# Patient Record
Sex: MALE | Race: WHITE | NOT HISPANIC OR LATINO | URBAN - NONMETROPOLITAN AREA
[De-identification: names, ages, dates, MRNs, and addresses within clinical notes are randomized per-mention and may not be internally consistent; named-entity substitution may affect disease eponyms.]

---

## 2021-06-15 ENCOUNTER — HOSPITAL ENCOUNTER (EMERGENCY)
Facility: HOSPITAL | Age: 41
Discharge: HOME | End: 2021-06-16
Attending: EMERGENCY MEDICINE | Admitting: EMERGENCY MEDICINE

## 2021-06-15 ENCOUNTER — APPOINTMENT (OUTPATIENT)
Dept: GENERAL RADIOLOGY | Facility: HOSPITAL | Age: 41
End: 2021-06-15

## 2021-06-15 DIAGNOSIS — F32.A DEPRESSION, UNSPECIFIED DEPRESSION TYPE: ICD-10-CM

## 2021-06-15 DIAGNOSIS — F19.10 POLYSUBSTANCE ABUSE (HCC): Primary | ICD-10-CM

## 2021-06-15 LAB
ALBUMIN SERPL-MCNC: 4.5 G/DL (ref 3.5–5.2)
ALBUMIN/GLOB SERPL: 1.7 G/DL
ALP SERPL-CCNC: 78 U/L (ref 39–117)
ALT SERPL W P-5'-P-CCNC: 10 U/L (ref 1–41)
AMPHET+METHAMPHET UR QL: POSITIVE
AMPHETAMINES UR QL: POSITIVE
ANION GAP SERPL CALCULATED.3IONS-SCNC: 9.7 MMOL/L (ref 5–15)
APAP SERPL-MCNC: <5 MCG/ML (ref 0–30)
AST SERPL-CCNC: 33 U/L (ref 1–40)
BARBITURATES UR QL SCN: NEGATIVE
BASOPHILS # BLD AUTO: 0.03 10*3/MM3 (ref 0–0.2)
BASOPHILS NFR BLD AUTO: 0.3 % (ref 0–1.5)
BENZODIAZ UR QL SCN: NEGATIVE
BILIRUB SERPL-MCNC: 0.9 MG/DL (ref 0–1.2)
BUN SERPL-MCNC: 11 MG/DL (ref 6–20)
BUN/CREAT SERPL: 11.5 (ref 7–25)
BUPRENORPHINE SERPL-MCNC: POSITIVE NG/ML
CALCIUM SPEC-SCNC: 9.3 MG/DL (ref 8.6–10.5)
CANNABINOIDS SERPL QL: NEGATIVE
CHLORIDE SERPL-SCNC: 100 MMOL/L (ref 98–107)
CO2 SERPL-SCNC: 26.3 MMOL/L (ref 22–29)
COCAINE UR QL: NEGATIVE
CREAT SERPL-MCNC: 0.96 MG/DL (ref 0.76–1.27)
DEPRECATED RDW RBC AUTO: 42.1 FL (ref 37–54)
EOSINOPHIL # BLD AUTO: 0.14 10*3/MM3 (ref 0–0.4)
EOSINOPHIL NFR BLD AUTO: 1.6 % (ref 0.3–6.2)
ERYTHROCYTE [DISTWIDTH] IN BLOOD BY AUTOMATED COUNT: 12.8 % (ref 12.3–15.4)
ETHANOL BLD-MCNC: <10 MG/DL (ref 0–10)
ETHANOL UR QL: <0.01 %
GFR SERPL CREATININE-BSD FRML MDRD: 86 ML/MIN/1.73
GLOBULIN UR ELPH-MCNC: 2.7 GM/DL
GLUCOSE SERPL-MCNC: 102 MG/DL (ref 65–99)
HCT VFR BLD AUTO: 41.4 % (ref 37.5–51)
HGB BLD-MCNC: 14.8 G/DL (ref 13–17.7)
IMM GRANULOCYTES # BLD AUTO: 0.04 10*3/MM3 (ref 0–0.05)
IMM GRANULOCYTES NFR BLD AUTO: 0.4 % (ref 0–0.5)
LYMPHOCYTES # BLD AUTO: 1.46 10*3/MM3 (ref 0.7–3.1)
LYMPHOCYTES NFR BLD AUTO: 16.4 % (ref 19.6–45.3)
MCH RBC QN AUTO: 32.3 PG (ref 26.6–33)
MCHC RBC AUTO-ENTMCNC: 35.7 G/DL (ref 31.5–35.7)
MCV RBC AUTO: 90.4 FL (ref 79–97)
METHADONE UR QL SCN: NEGATIVE
MONOCYTES # BLD AUTO: 1.46 10*3/MM3 (ref 0.1–0.9)
MONOCYTES NFR BLD AUTO: 16.4 % (ref 5–12)
NEUTROPHILS NFR BLD AUTO: 5.76 10*3/MM3 (ref 1.7–7)
NEUTROPHILS NFR BLD AUTO: 64.9 % (ref 42.7–76)
NRBC BLD AUTO-RTO: 0 /100 WBC (ref 0–0.2)
OPIATES UR QL: NEGATIVE
OXYCODONE UR QL SCN: NEGATIVE
PCP UR QL SCN: NEGATIVE
PLATELET # BLD AUTO: 199 10*3/MM3 (ref 140–450)
PMV BLD AUTO: 11.1 FL (ref 6–12)
POTASSIUM SERPL-SCNC: 3.9 MMOL/L (ref 3.5–5.2)
PROPOXYPH UR QL: NEGATIVE
PROT SERPL-MCNC: 7.2 G/DL (ref 6–8.5)
RBC # BLD AUTO: 4.58 10*6/MM3 (ref 4.14–5.8)
SALICYLATES SERPL-MCNC: <0.3 MG/DL
SODIUM SERPL-SCNC: 136 MMOL/L (ref 136–145)
TRICYCLICS UR QL SCN: NEGATIVE
WBC # BLD AUTO: 8.89 10*3/MM3 (ref 3.4–10.8)

## 2021-06-15 PROCEDURE — 82077 ASSAY SPEC XCP UR&BREATH IA: CPT | Performed by: EMERGENCY MEDICINE

## 2021-06-15 PROCEDURE — 73130 X-RAY EXAM OF HAND: CPT

## 2021-06-15 PROCEDURE — 80306 DRUG TEST PRSMV INSTRMNT: CPT | Performed by: EMERGENCY MEDICINE

## 2021-06-15 PROCEDURE — 80143 DRUG ASSAY ACETAMINOPHEN: CPT | Performed by: EMERGENCY MEDICINE

## 2021-06-15 PROCEDURE — 99284 EMERGENCY DEPT VISIT MOD MDM: CPT

## 2021-06-15 PROCEDURE — 96374 THER/PROPH/DIAG INJ IV PUSH: CPT

## 2021-06-15 PROCEDURE — 25010000002 LORAZEPAM PER 2 MG: Performed by: EMERGENCY MEDICINE

## 2021-06-15 PROCEDURE — 80179 DRUG ASSAY SALICYLATE: CPT | Performed by: EMERGENCY MEDICINE

## 2021-06-15 PROCEDURE — 73110 X-RAY EXAM OF WRIST: CPT

## 2021-06-15 PROCEDURE — 80053 COMPREHEN METABOLIC PANEL: CPT | Performed by: EMERGENCY MEDICINE

## 2021-06-15 PROCEDURE — 85025 COMPLETE CBC W/AUTO DIFF WBC: CPT | Performed by: EMERGENCY MEDICINE

## 2021-06-15 RX ORDER — LORAZEPAM 2 MG/ML
1 INJECTION INTRAMUSCULAR ONCE
Status: COMPLETED | OUTPATIENT
Start: 2021-06-15 | End: 2021-06-15

## 2021-06-15 RX ORDER — DIPHENHYDRAMINE HYDROCHLORIDE AND LIDOCAINE HYDROCHLORIDE AND ALUMINUM HYDROXIDE AND MAGNESIUM HYDRO
5 KIT ONCE
Status: DISCONTINUED | OUTPATIENT
Start: 2021-06-15 | End: 2021-06-16 | Stop reason: HOSPADM

## 2021-06-15 RX ADMIN — LORAZEPAM 1 MG: 2 INJECTION INTRAMUSCULAR; INTRAVENOUS at 20:18

## 2021-06-15 NOTE — ED PROVIDER NOTES
Subjective   History of Present Illness     41-year-old male presents ER by EMS for vague complaints of depression symptoms and wanting to talk to someone.  Patient was reportedly on telephone with his mother who felt like he needed help so she called the police.  EMS was then called and patient was brought here to be evaluated.  Patient denies suicidal ideation.  He admits to feeling depressed.  History of polysubstance abuse.  Patient has recently been in correction.  States was here in a long-term house.  Is very difficult to get meaningful history history out of him    Review of Systems   Unable to perform ROS: Psychiatric disorder       No past medical history on file.    No Known Allergies    No past surgical history on file.    No family history on file.    Social History     Socioeconomic History   • Marital status: Single     Spouse name: Not on file   • Number of children: Not on file   • Years of education: Not on file   • Highest education level: Not on file   Tobacco Use   • Smoking status: Current Every Day Smoker     Packs/day: 1.50     Types: Cigarettes   Substance and Sexual Activity   • Alcohol use: Never   • Drug use: Not Currently     Types: Methamphetamines     Comment: LAST USE 1 WEEK AGO           Objective   Physical Exam  Vitals and nursing note reviewed.   Constitutional:       Comments: Tearful, also psychomotor agitation   HENT:      Head: Normocephalic and atraumatic.      Nose: Nose normal.      Mouth/Throat:      Mouth: Mucous membranes are moist.   Eyes:      Conjunctiva/sclera: Conjunctivae normal.   Cardiovascular:      Rate and Rhythm: Tachycardia present.      Pulses: Normal pulses.   Pulmonary:      Effort: Pulmonary effort is normal.      Breath sounds: Normal breath sounds.   Skin:     General: Skin is warm and dry.      Capillary Refill: Capillary refill takes less than 2 seconds.   Neurological:      General: No focal deficit present.      Mental Status: He is alert.   Psychiatric:       Comments: Depressed mood, psychomotor agitation         Procedures           ED Course  ED Course as of Jun 16 0507   Tue Hardy 15, 2021   1645 BP: 113/88 [CS]   1645 Heart Rate(!): 133 [CS]   1645 Resp: 28 [CS]   1645 Not hypoxic   SpO2: 98 % [CS]   1732 3 views of the left wrist interpreted by me.  Normal soft tissues.  No fracture.  No dislocation.  3 views of the left hand interpreted by me.  No fracture, no dislocation, 2 radiopaque foreign bodies in the soft tissues    [CS]      ED Course User Index  [CS] Omid Fontenot MD                                           [facility]    Final diagnoses:   Polysubstance abuse (CMS/[facility])   Depression, unspecified depression type       ED Disposition  ED Disposition     ED Disposition Condition Comment    Discharge Stable           No follow-up provider specified.       Medication List      No changes were made to your prescriptions during this visit.          Omid Fontenot MD  06/15/21 6544        Care of the patient was signed to me while awaiting urine drug screen and then patient sobriety.  Patient had symptoms of paranoia and appeared intoxicated on initial evaluation.  Drug screen did reveal methamphetamine as well as Suboxone.  Other labs largely unremarkable.  Patient was given a dose of Ativan due to agitation.  Patient was observed in the emergency department for several hours.  He did have gradual improvement in his symptoms.  On repeat assessment patient was alert and oriented.  He continues to deny any suicidal ideation.  He is requesting to be discharged home at this time.  Will discharge with outpatient follow-up and return precautions.       Elvi Mckeon MD  06/16/21 8594

## 2021-06-16 VITALS
HEART RATE: 85 BPM | OXYGEN SATURATION: 98 % | TEMPERATURE: 98.9 F | RESPIRATION RATE: 16 BRPM | DIASTOLIC BLOOD PRESSURE: 90 MMHG | HEIGHT: 69 IN | BODY MASS INDEX: 25.18 KG/M2 | SYSTOLIC BLOOD PRESSURE: 133 MMHG | WEIGHT: 170 LBS

## 2021-06-16 NOTE — CONSULTS
"Roland Anne  1980    TIME: [id number]    Is patient agreeable to admission/treatment? Yes    Guardian: (Must have paperwork) FAMILY MEMBER - GUARDIAN: self    Pt Lives With:  Pt reported that he was in a half way house set up by his  but the bed got messed up    Highest Level of Education: pt did not disclose     Presenting Problems: (How is the patient a danger to self or others?) Pt presents to the ED per EMS for a psychiatric evaluation. The pt presents with delusional thought processing and paranoia. Pt denies SI and HI.    Current Stressors: chemical dependency/abuse, family problems, housing  concerns, legal concerns, mental health condition and relationship concerns    Depression: pt reported that his current depression level is high. Pt reported that he is usually happy but right now high levels of depression and reported, \" I just want to live.\"     Anxiety: 10; pt reported it is the worst his anxiety has ever been    Previous Psychiatric Treatment: No; pt denied treatment but reports every time he goes to [facility] he starts back on suboxone.    Suicidal: Absent        [place]-SUICIDE SEVERITY RATING SCALE  Psychiatric Inpatient Setting - Discharge Screener    Ask questions that are bold and underlined Discharge   Ask Questions 1 and 2 YES NO   1) Wish to be Dead:   Person endorses thoughts about a wish to be dead or not alive anymore, or wish to fall asleep and not wake up.  While you were here in the hospital, have you wished you were dead or wished you could go to sleep and not wake up?  x   2) Suicidal Thoughts:   General non-specific thoughts of wanting to end one's life/die by suicide, “I've thought about killing myself” without general thoughts of ways to kill oneself/associated methods, intent, or plan.   While you were here in the hospital, have you actually had thoughts about killing yourself?   x   If YES to 2, ask questions 3, 4, 5, and 6.  If NO to 2, go directly to " question 6   3) Suicidal Thoughts with Method (without Specific Plan or Intent to Act):   Person endorses thoughts of suicide and has thought of a least one method during the assessment period. This is different than a specific plan with time, place or method details worked out. “I thought about taking an overdose but I never made a specific plan as to when where or how I would actually do it….and I would never go through with it.”   Have you been thinking about how you might kill yourself?      4) Suicidal Intent (without Specific Plan):   Active suicidal thoughts of killing oneself and patient reports having some intent to act on such thoughts, as opposed to “I have the thoughts but I definitely will not do anything about them.”   Have you had these thoughts and had some intention of acting on them or do you have some intention of acting on them after you leave the hospital?      5) Suicide Intent with Specific Plan:   Thoughts of killing oneself with details of plan fully or partially worked out and person has some intent to carry it out.   Have you started to work out or worked out the details of how to kill yourself either for while you were here in the hospital or for after you leave the hospital? Do you intend to carry out this plan?        6) Suicide Behavior    While you were here in the hospital, have you done anything, started to do anything, or prepared to do anything to end your life?    Examples: Took pills, cut yourself, tried to hang yourself, took out pills but didn't swallow any because you changed your mind or someone took them from you, collected pills, secured a means of obtaining a gun, gave away valuables, wrote a will or suicide note, etc.  x       Family Hx of Mental Health/Substance Abuse: Pt did not disclose      Delusions: bizarre and pt reports paranoia ; pt reports that his white supremacy brother are after him and he is unsure why but know that it has to do with his phone and the  "hidden apps on his phone     Hallucinations: None, Not demonstrated today and SHE Fam reported that the pt's mother called and reported that the pt has been showing paranoia for about 9 months. Cristel reported that the mother told her that the pt recently punched a hole in her wall looking for the person who was inside of it after asking her if she saw the person's face coming out of it.    Mood: irritable and labile     Homicidal Ideations: Absent     Abuse History: Further details: pt did not disclose     Does this require reporting: N/A    Legal History / History of Violence: The patient has had legal significant legal charges for organized crime, robbery, and reports a recent sanction for substance use .     Sleep: Poor    Appetite: Poor    Current Medical Conditions: Yes    If yes, explain: Substance use    Current Psychiatric Medications:pt reports taking suboxone     History of Inappropriate Sexual Behavior: N/A    Hopelessness: Moderate    Orientation: alert and oriented to person, place, and time     Substance Abuse: regular daily use    COWS: 0    CIWA: 0    Withdrawal Symptoms: N/A     History of DT's: N/A    History of Seizures: N/A    SUBSTANCE ABUSE HISTORY:      DRUG   PRESENT USE  Y/N   AGE @ 1ST USE    ROUTE   HOW MUCH   HOW OFTEN   HOW LONG AT THIS RATE   Date of last use/  Amount used   Nicotine            Alcohol            Marijuana            Benzos              Neurontin            Methadone            Opiates     n      Reports hx of xanax use   Cocaine    n 14/15 Snorting, smoking, IV \"a lot\" daily  Reports sober for 4 years   Heroin   n      Reports hx of use    Meth   y  Snorting or smoking Pt did not disclose; reports not a lot like others   Reports relapse 1 week ago   Suboxone   y      Reports that he gets it while in snf prescribed to him           DATA:   This therapist received a call from [facility] staff (SHE Fam) with orders from (MD Po) for a behavioral health consult.  The patient " "serves as his own guardian and is agreeable to speak with me.  Met with patient at bedside. Patient is not under 1:1 security monitoring during assessment.  Patient is a 41 year old, single, , male residing in [place], [place]. Patient currently was supposed to be staying in a half way house set up by his  but reports that the bed got messed up.  Patient is unemployed.      Patient presents today with chief compliant of paranoia and substance abuse.  Pt presents to the ED per EMS for a psychiatric evaluation. The pt presents with delusional thought processing and paranoia. Pt denies SI and HI. The pt reported that while he was at [facility] today he called his mother regarding brothers from the [employer] being after him due to things that were uploaded to [employer] regarding him and his \"baby momma.\" The pt reported that he was swinging with her but that they are after him and he is unsure why. The pt reported many times and attempted to show the therapist hidden apps on his phone. The therapist did not observe any apps at this time. The pt reported that he is apart of the brothers and he has been a good brother while out and in residential and is unsure why they would be after him. The pt reported that he has spent half of his life in residential and reports that he just wants to live. The pt reported that he was in residential for 9.5 years for organized crime and robbery before he was released. The pt reported that he ended up dropping \"dirty\" for meth and and was sanctioned for 60 days and then was sent to live at the [facility] by his . The pt reported that at the [facility] two brothers were there and are now after him. SHE Fam reported that the pt's mother called and reported that the pt has been showing paranoia for about 9 months. Cristel reported that the mother told her that the pt recently punched a hole in her wall looking for the person who was inside of it after " asking her if she saw the person's face coming out of it. Pt kept reporting that his mother just will not believe him that he is not paranoid and that everything he is saying is true.     The pt denies SI, hx of suicide attempts, HI, self-injurious behaviors, AH/VH. The pt reported a long hx of substance use but reported that he recently relapsed on methamphetamine. The pt reported that he has not used in a week. The pt reported that he is not paranoid due to the substance use that everything is real.     Pt presents with disorganized thought processing, flight of ideas, restlessness, delusions/paranoia, labile mood, and tearfulness. While in the assessment the pt was restless, moving from the bed to walking around the room. The pt would not stay in the ER room and continued to come out to try and talk to the nurse or therapist.     The pt denied a hx of psychiatric care. The pt reported that he does not want to go to an inpatient psychiatric unit because he just got out of  [facility] and wants to spend time with his child (4) who currently lives with the babies mother. The pt reported that he would be willing to try outpatient behavioral health care.  Pt denied that he is currently taking any psychiatric medications but reports taking suboxone while in [facility].    Patient reports to be agreeable for treatment recommendations.     ASSESSMENT:    Therapist completed CSSRS with patient for suicide risk assessment.  The results of patient’s CSSRS suggest that patient is low to moderate risk for suicide as evidenced by increased anxiety, depression with hopelessness and recent relapse on substances.  Patient holds attention and is Cooperative with assessment.  Patient’s appearance is clean and casually dressed, appropriate.  The patient displays Restless psychomotor behavior. The patient's affect appears labile. The patient is observed to have fast speech and pressured speech of speech.   Patient observed to have Fair and  Non-sustained eye contact. The patient's displays poor insight, with poor impulse control and limited judgement.     PLAN:    At this time, therapist recommends inpatient treatment based upon increased paranoia. Patient reports that he is not agreeable to inpatient treatment but would be willing to try outpatient services. At this time, pt continues to adamantly deny SI, HI, and denies that he is a harm to himself.  The therapist is waiting for all labs to process before completing disposition recommendation.     1845: Therapist updated oncoming therapist regarding case disposition. Still waiting for drug screen to process. Therapist handed case to oncoming therapist, [facility]